# Patient Record
Sex: FEMALE | Race: WHITE
[De-identification: names, ages, dates, MRNs, and addresses within clinical notes are randomized per-mention and may not be internally consistent; named-entity substitution may affect disease eponyms.]

---

## 2017-03-30 ENCOUNTER — HOSPITAL ENCOUNTER (OUTPATIENT)
Dept: HOSPITAL 10 - GIL | Age: 67
Discharge: HOME | End: 2017-03-30
Attending: INTERNAL MEDICINE
Payer: MEDICARE

## 2017-03-30 VITALS — DIASTOLIC BLOOD PRESSURE: 71 MMHG | RESPIRATION RATE: 19 BRPM | SYSTOLIC BLOOD PRESSURE: 126 MMHG | HEART RATE: 86 BPM

## 2017-03-30 VITALS — SYSTOLIC BLOOD PRESSURE: 132 MMHG | RESPIRATION RATE: 20 BRPM | HEART RATE: 72 BPM | DIASTOLIC BLOOD PRESSURE: 62 MMHG

## 2017-03-30 VITALS
WEIGHT: 216.93 LBS | WEIGHT: 216.93 LBS | BODY MASS INDEX: 32.88 KG/M2 | HEIGHT: 68 IN | HEIGHT: 68 IN | BODY MASS INDEX: 32.88 KG/M2

## 2017-03-30 DIAGNOSIS — K44.9: ICD-10-CM

## 2017-03-30 DIAGNOSIS — K29.60: ICD-10-CM

## 2017-03-30 DIAGNOSIS — R19.4: Primary | ICD-10-CM

## 2017-03-30 DIAGNOSIS — E66.9: ICD-10-CM

## 2017-03-30 DIAGNOSIS — D12.3: ICD-10-CM

## 2017-03-30 DIAGNOSIS — I10: ICD-10-CM

## 2017-03-30 DIAGNOSIS — E78.5: ICD-10-CM

## 2017-03-30 DIAGNOSIS — K64.8: ICD-10-CM

## 2017-03-30 PROCEDURE — 88305 TISSUE EXAM BY PATHOLOGIST: CPT

## 2017-03-30 PROCEDURE — 87081 CULTURE SCREEN ONLY: CPT

## 2017-03-30 NOTE — GILP
DATE OF PROCEDURE:  

 

 

NAME OF PROCEDURES:

1.  Esophagogastroduodenoscopy and biopsy.

2.  Colonoscopy and biopsy.

 

SURGEON:  David Alexis MD

 

PREOPERATIVE DIAGNOSES:

1.  Abdominal pain.

2.  Chronic heartburn.

3.  Change in the bowel habit.

4.  Rectal bleeding.

 

POSTOPERATIVE DIAGNOSES:

1.  Hiatal hernia.

2.  Reflux esophagitis.

3.  Gastritis with erosions.

4.  Gastric mucosal biopsies were taken for Helicobacter pylori test.

5.  Colonoscopy all the way to the cecum.

6.  Small transverse colon polyp was removed using the biopsy forceps.

7.  Internal hemorrhoids.

 

INDICATION FOR THE PROCEDURE:  Ms. Lorelei Mcclelland is a 66-year-old female patient who had upper abd
ominal pain and chronic heartburn, not responding to therapy.  She also noticed a change in the yvette
l habit and rectal bleeding.  The patient was scheduled for endoscopy and colonoscopy for further ev
aluation.

 

The procedures and possible complications are well explained to the patient.  She understood and con
sented to the procedure.

 

DESCRIPTION OF PROCEDURE:  Under the influence of anesthesia, the gastroscope was carefully introduc
ed into the esophagus and under direct vision, it was advanced to the stomach and through the pyloru
s into the duodenal bulb and descending duodenum.

 

FINDINGS:

ESOPHAGUS:  The patient had hiatal hernia and reflux esophagitis.

STOMACH:  The patient had gastritis with erosions.  Gastric mucosal biopsies were taken for H. pylor
i test.

DUODENUM:  Normal.

 

The colonoscope was carefully introduced in the rectum and under direct vision, it was advanced all 
the way to the cecum.

 

FINDINGS:  The patient had a small transverse colon polyp and it was removed using the biopsy forcep
s.  She had internal hemorrhoids.

 

She tolerated the procedures very well and there was no complication from the procedures.  At the en
d of the procedures, she was awake with stable vital signs and she was discharged home to the care o
f her family.

 

IMPRESSION:  Please see postoperative diagnosis.

 

PLAN:

1.  Continue omeprazole.

2.  Anusol-HC suppository at bedtime.

3.  Await histopathology reports.  

4.  Next screening colonoscopy in 10 years.

 

 

Dictated By: DAVID PERAZA/TOBIAS

DD:    03/30/2017 15:49:12

DT:    03/30/2017 16:06:12

Conf#: [id number]

DID#:  [id number]

## 2017-03-31 NOTE — CONS
DATE OF ADMISSION: 03/30/2017

DATE OF CONSULTATION:  

 

 

 

TYPE OF CONSULTATION: Preoperative gastroenterology.

 

HISTORY OF PRESENT ILLNESS:  Ms. Lorelei Mcclelland is a 66-year-old female patient who has been referr
ed to me for further evaluation of abdominal pain and rectal bleeding.  The patient states she has e
pigastric pain and chronic heartburn, not responding to therapy with omeprazole.  There is no past h
istory of peptic ulcer disease.  The patient is taking baby aspirin a day.  There is no history of g
allstones.  She does not have any fever, chills, or jaundice.  There is no history of liver disease.
  The patient also complains of change in the bowel habit with constipation.  She also has rectal bl
eeding.  There is no past history of colon neoplasm.  The patient has hypertension.  She is not on a
ny medication at the present time.  She is not a diabetic.  She does not have any heart disease or l
neha problem.  There is no history of kidney disease.  She has hyperlipidemia.  The patient has arthr
itis and she is status post right knee replacement surgery.  She is on Norco.  She has bipolar disor
louis.

 

SOCIAL HISTORY:  She is a smoker.  She also smokes marijuana occasionally.

 

FAMILY HISTORY:  Particular for the history of pancreatic cancer in her father.

 

ALLERGIES:  SHE STATES SHE IS ALLERGIC TO SULFA.

 

MEDICATIONS:

1.  Omeprazole 40 mg.  

2.  Norco 1 tablet p.r.n.

3.  Aspirin 81 mg.

4.  Crestor 5 mg.  

5.  Celexa 40 mg b.i.d.

6.  Abilify 1 tablet a day.

 

PHYSICAL EXAMINATION:

VITAL SIGNS:  She is 5 feet 8 inches tall and she weighs 200 pounds.  BMI 31.  Blood pressure 160/10
0.

HEART:  Examination of the heart reveals normal first and second heart sounds.

LUNGS:  Clear.

ABDOMEN:  Soft without any distention.  Liver and spleen are not palpable.  There are no masses.  Th
ere is no focal tenderness.  Normal bowel sounds are heard.

RECTAL:  Examination deferred per the patient's request.  It will be done at the time of colonoscopy
.

CENTRAL NERVOUS SYSTEM:  Does not reveal any focal neurological deficit.

 

IMPRESSION:

1.  Upper abdominal pain and chronic heartburn, not responding to therapy with omeprazole.

2.  The patient is on baby aspirin a day.

3.  Change in the bowel habit with constipation.

4.  Rectal bleeding.

5.  Hypertension.

6.  Hyperlipidemia.

7.  Arthritis.

8.  Status post knee replacement surgery on the right side.

9.  Bipolar disorder.

10.  The patient's father had pancreatic cancer.

11.  The patient is a smoker.

12.  Elevated BMI.

13.  HISTORY OF ALLERGY TO SULFA.

 

PLAN:

1.  The patient was advised to stop smoking.

2.  The patient was strongly advised to lose weight.

3.  Dietary consultation was recommended.

4.  Follow up with the primary MD for the management of elevated BMI.

5.  The patient was advised to have followup with the primary MD for the followup and management of 
hypertension.

6.  Continue omeprazole.

7.  MiraLax 17 grams dissolved in a glass of water p.o. daily for constipation.

8.  Endoscopy and colonoscopy for further evaluation.

9.  Because of the multiple medical problems and obesity, the patient will need monitored anesthesia
 care for the procedures.

 

Procedures and possible complications are well explained to the patient.  She understands and consen
ts to the procedures.

 

I thank you once again.

 

With warmest personal regards,

 

 

Dictated By: LIAT PERAZA/TOBIAS

DD:    03/27/2017 16:57:31

DT:    03/27/2017 19:52:03

Conf#: [id number]

DID#:  [id number]

CC: LIAT GASTON MD;*EndCC*

## 2019-08-20 ENCOUNTER — HOSPITAL ENCOUNTER (INPATIENT)
Dept: HOSPITAL 54 - GPS | Age: 69
LOS: 3 days | Discharge: LEFT BEFORE DISCHARGE | DRG: 885 | End: 2019-08-23
Attending: NURSE PRACTITIONER | Admitting: PSYCHIATRY & NEUROLOGY
Payer: MEDICARE

## 2019-08-20 VITALS — WEIGHT: 195 LBS | BODY MASS INDEX: 29.55 KG/M2 | HEIGHT: 68 IN

## 2019-08-20 VITALS — SYSTOLIC BLOOD PRESSURE: 123 MMHG | DIASTOLIC BLOOD PRESSURE: 75 MMHG

## 2019-08-20 VITALS — SYSTOLIC BLOOD PRESSURE: 148 MMHG | DIASTOLIC BLOOD PRESSURE: 97 MMHG

## 2019-08-20 DIAGNOSIS — F31.30: Primary | ICD-10-CM

## 2019-08-20 DIAGNOSIS — F11.23: ICD-10-CM

## 2019-08-20 DIAGNOSIS — R45.851: ICD-10-CM

## 2019-08-20 DIAGNOSIS — F17.210: ICD-10-CM

## 2019-08-20 DIAGNOSIS — Z88.2: ICD-10-CM

## 2019-08-20 DIAGNOSIS — Z96.651: ICD-10-CM

## 2019-08-20 DIAGNOSIS — I10: ICD-10-CM

## 2019-08-20 DIAGNOSIS — E83.42: ICD-10-CM

## 2019-08-20 DIAGNOSIS — Z91.14: ICD-10-CM

## 2019-08-20 DIAGNOSIS — E03.9: ICD-10-CM

## 2019-08-20 DIAGNOSIS — G92: ICD-10-CM

## 2019-08-20 DIAGNOSIS — G89.4: ICD-10-CM

## 2019-08-20 DIAGNOSIS — M79.7: ICD-10-CM

## 2019-08-20 DIAGNOSIS — M25.569: ICD-10-CM

## 2019-08-20 RX ADMIN — CYCLOBENZAPRINE HYDROCHLORIDE PRN MG: 10 TABLET, FILM COATED ORAL at 22:13

## 2019-08-20 RX ADMIN — SIMVASTATIN SCH MG: 10 TABLET, FILM COATED ORAL at 22:18

## 2019-08-20 RX ADMIN — LORAZEPAM PRN MG: 1 TABLET ORAL at 22:14

## 2019-08-20 RX ADMIN — ACETAMINOPHEN PRN MG: 325 TABLET ORAL at 23:14

## 2019-08-20 RX ADMIN — DONEPEZIL HYDROCHLORIDE SCH MG: 5 TABLET ORAL at 22:14

## 2019-08-20 NOTE — NUR
Admitted a 70 y/o female from [facility]. On 5150 hold as DTS. Patient 
admitting Dx. Depression. Medical dx. chronic pain at right knee, fibromyalgia, redness 
breast fold fungal. Upon face to face evaluation, patient appeared alert and oriented x 3, 
calm, cooperative, hyperverbal, needy, paranoid, suspicious, denies SI/HI/AH/VH. Body check 
done. Picture done. Explained the paper works and patient signed the consents. Informed of 
visiting hours and unit policies. Belongings and contraband checked. Q15 min checks 
initiated. Care plan started. Vital signs checked and recorded. Patient's rights discussed, 
guide to prescription meds handbook provided. Patient  advised of the hold. Notified Craig Antoine to reconcile meds and order of wound consult,  notified Sapphire (daughter) of the 
patients admission, notified Dr. Sims of the admission. Will monitor patient for mood, 
safety and behavior. Will endorse to the day shift.

## 2019-08-21 VITALS — SYSTOLIC BLOOD PRESSURE: 148 MMHG | DIASTOLIC BLOOD PRESSURE: 97 MMHG

## 2019-08-21 VITALS — SYSTOLIC BLOOD PRESSURE: 148 MMHG | DIASTOLIC BLOOD PRESSURE: 96 MMHG

## 2019-08-21 VITALS — SYSTOLIC BLOOD PRESSURE: 163 MMHG | DIASTOLIC BLOOD PRESSURE: 85 MMHG

## 2019-08-21 VITALS — SYSTOLIC BLOOD PRESSURE: 161 MMHG | DIASTOLIC BLOOD PRESSURE: 69 MMHG

## 2019-08-21 LAB
BASOPHILS # BLD AUTO: 0.1 /CMM (ref 0–0.2)
BASOPHILS NFR BLD AUTO: 0.6 % (ref 0–2)
BUN SERPL-MCNC: 16 MG/DL (ref 7–18)
CALCIUM SERPL-MCNC: 9.3 MG/DL (ref 8.5–10.1)
CHLORIDE SERPL-SCNC: 102 MMOL/L (ref 98–107)
CHOLEST SERPL-MCNC: 241 MG/DL (ref ?–200)
CO2 SERPL-SCNC: 23 MMOL/L (ref 21–32)
CREAT SERPL-MCNC: 0.7 MG/DL (ref 0.6–1.3)
EOSINOPHIL NFR BLD AUTO: 0.3 % (ref 0–6)
GLUCOSE SERPL-MCNC: 118 MG/DL (ref 74–106)
HCT VFR BLD AUTO: 43 % (ref 33–45)
HDLC SERPL-MCNC: 55 MG/DL (ref 40–60)
HGB BLD-MCNC: 14.8 G/DL (ref 11.5–14.8)
LDLC SERPL DIRECT ASSAY-MCNC: 158 MG/DL (ref 0–99)
LYMPHOCYTES NFR BLD AUTO: 1.8 /CMM (ref 0.8–4.8)
LYMPHOCYTES NFR BLD AUTO: 17.7 % (ref 20–44)
MAGNESIUM SERPL-MCNC: 1.7 MG/DL (ref 1.8–2.4)
MCHC RBC AUTO-ENTMCNC: 34 G/DL (ref 31–36)
MCV RBC AUTO: 89 FL (ref 82–100)
MONOCYTES NFR BLD AUTO: 0.8 /CMM (ref 0.1–1.3)
MONOCYTES NFR BLD AUTO: 8.4 % (ref 2–12)
NEUTROPHILS # BLD AUTO: 7.4 /CMM (ref 1.8–8.9)
NEUTROPHILS NFR BLD AUTO: 73 % (ref 43–81)
PHOSPHATE SERPL-MCNC: 3.7 MG/DL (ref 2.5–4.9)
PLATELET # BLD AUTO: 252 /CMM (ref 150–450)
POTASSIUM SERPL-SCNC: 3.7 MMOL/L (ref 3.5–5.1)
RBC # BLD AUTO: 4.82 MIL/UL (ref 4–5.2)
SODIUM SERPL-SCNC: 138 MMOL/L (ref 136–145)
TRIGL SERPL-MCNC: 147 MG/DL (ref 30–150)
TSH SERPL DL<=0.005 MIU/L-ACNC: 4.09 UIU/ML (ref 0.36–3.74)
WBC NRBC COR # BLD AUTO: 10.1 K/UL (ref 4.3–11)

## 2019-08-21 RX ADMIN — PANTOPRAZOLE SODIUM SCH MG: 40 TABLET, DELAYED RELEASE ORAL at 08:12

## 2019-08-21 RX ADMIN — DIVALPROEX SODIUM SCH MG: 250 TABLET, DELAYED RELEASE ORAL at 14:24

## 2019-08-21 RX ADMIN — CYCLOBENZAPRINE HYDROCHLORIDE PRN MG: 10 TABLET, FILM COATED ORAL at 10:28

## 2019-08-21 RX ADMIN — LORAZEPAM PRN MG: 1 TABLET ORAL at 04:16

## 2019-08-21 RX ADMIN — Medication PRN EACH: at 06:51

## 2019-08-21 RX ADMIN — Medication PRN EACH: at 00:15

## 2019-08-21 RX ADMIN — LORAZEPAM PRN MG: 1 TABLET ORAL at 08:12

## 2019-08-21 RX ADMIN — LEVOTHYROXINE SODIUM SCH MCG: 25 TABLET ORAL at 08:12

## 2019-08-21 RX ADMIN — ACETAMINOPHEN PRN MG: 325 TABLET ORAL at 05:31

## 2019-08-21 RX ADMIN — LORAZEPAM PRN MG: 1 TABLET ORAL at 21:31

## 2019-08-21 RX ADMIN — ZOLPIDEM TARTRATE PRN MG: 5 TABLET, FILM COATED ORAL at 21:31

## 2019-08-21 RX ADMIN — DIVALPROEX SODIUM SCH MG: 250 TABLET, DELAYED RELEASE ORAL at 21:31

## 2019-08-21 RX ADMIN — Medication PRN EACH: at 11:23

## 2019-08-21 RX ADMIN — Medication PRN EACH: at 20:08

## 2019-08-21 RX ADMIN — Medication PRN EACH: at 16:03

## 2019-08-21 RX ADMIN — CLOTRIMAZOLE AND BETAMETHASONE DIPROPIONATE SCH GM: 10; .5 CREAM TOPICAL at 17:20

## 2019-08-21 RX ADMIN — DONEPEZIL HYDROCHLORIDE SCH MG: 5 TABLET ORAL at 21:31

## 2019-08-21 RX ADMIN — NICOTINE SCH MG: 21 PATCH TRANSDERMAL at 08:20

## 2019-08-21 RX ADMIN — SIMVASTATIN SCH MG: 10 TABLET, FILM COATED ORAL at 21:32

## 2019-08-21 RX ADMIN — CITALOPRAM SCH MG: 20 TABLET ORAL at 14:24

## 2019-08-21 RX ADMIN — MEMANTINE HYDROCHLORIDE SCH MG: 5 TABLET ORAL at 08:16

## 2019-08-21 RX ADMIN — ZOLPIDEM TARTRATE PRN MG: 5 TABLET, FILM COATED ORAL at 01:17

## 2019-08-21 RX ADMIN — LEVOTHYROXINE SODIUM SCH MCG: 25 TABLET ORAL at 06:52

## 2019-08-21 NOTE — NUR
RN NOTE: CONTACTED DR. GRIJALVA IN REGARDS TO PATIENT STATING SHE'S HAVING WITHDRAWAL 
SYMPTOMS. PATIENT PRESENTS WITH RESTLESS, ANXIOUS BEHAVIOR, SWEATING AND CRYING IN HALLS. 
RETRIEVED ORDERS TO CHANGE NORCO TO Q4 HOURS PRN AND TO GET A PAIN CONSULT.

## 2019-08-21 NOTE — NUR
EMILY called the pts friend, Jaya ([phone]), and informed him about the pts initial 
treatment plan and discharge plan and then inquired about whether or not he has the pts 
daughters phone number. He stated that he did not have it and stated that he would visit the 
pt.

## 2019-08-21 NOTE — NUR
Initial Discharge Plan: Pt currently resides at her home located at [address] [place] [address]; ([phone]). Per pt, she would like to return home and then go 
to a treatment center. EMILY will work with the pt and the MD regarding appropriate discharge 
planning. SW will form a safe and proper discharge.

## 2019-08-21 NOTE — NUR
RN NOTE: PATIENT IS COMPLAINING OF EXTREME PAIN. ALL OF HER PAIN MEDS WERE EXHAUSTED. THEN, 
PATIENT STATED SHES NOT IN PAIN AND SHE NEEDS TO GO SOMEWHERE FOR DETOX THERAPY. INFORMED 
MD RUDI OF PATIENT'S COMPLAINTS. PATIENT IS VERY ANXIOUS AND KEEPS STATING SHE WANTS TO 
GO TO A DETOX CENTER. INFORMED DR. LOZANO THAT PATIENT WANTS TO SPEAK TO HIM AND HE STATED 
HE WILL BE SEEING HER TODAY. PRN HYDRALAZINE GIVEN TO PATIENT FOR /69.

## 2019-08-21 NOTE — NUR
RN NOTE: PAIN MANAGEMENT CONSULT ORDER INPUT. CONTACTED THE OFFICES OF [facility] 
AND LEFT A VOICEMAIL FOR CONSULTATION. INFORMED PATIENT ABOUT WAYS TO MANAGE PAIN BUT 
PATIENT ONLY WANTS PAIN MEDICATION. THROUGH OBSERVATION, PATIENT IS VERY ANXIOUS AND 
AGITATED WHEN SHE REQUESTS PAIN MEDICATION. A MOMENT AFTER MEDICATION WAS GIVEN, PATIENT WAS 
ROAMING DOWN HALLWAYS WITH A SMILE ON HER FACE, NO FACIAL GRIMACE OR AGITATION AND NO 
NON-VERBAL SIGNS OF DISTRESS NOTED. I INFORMED PATIENT THAT I HAD CHANGED THE FREQUENCY PER 
DR ORDER FOR NORCO FROM Q6HRS TO Q4HRS AND SHE WAS STILL UNHAPPY. I ALSO INFORMED HER THAT I 
HAD MADE A PAIN MANAGEMENT ORDER FOR CONSULTATION.

## 2019-08-21 NOTE — NUR
GPS RN NOTE:



PATIENT C/O 9/10 CRAMPING PAIN ON BOTH LEGS, PATIENT WAS SHAKING, UNEASY, REQUESTING FOR 
NORCO , BECAUSE PER PATIENT SHE IS HAVING A WITHDRAWAL. HOT COMPRESS GIVEN, FLEXERIL GIVEN 
AS ORDERED, ATIVAN 1MG PO GIVEN FOR ANXIETY, TYLENOL GIVEN AS REQUESTED. PER PATIENT ITS NOT 
EFFECTIVE. NOTIFIED NNSHAHLA BROOKS WITH ORDER OF NORCO GIVEN NOTED AND CARRIED OUT. WILL 
CONTINUE TO MONITOR Q15 MINS FOR SAFETY

-------------------------------------------------------------------------------

Addendum: 08/21/19 at 0535 by JACQUELYN RAMIRES II, RN

-------------------------------------------------------------------------------

OFFERED TRAMADOL AS ORDERED AND PATIENT REFUSED, DOUGLAS BURKS

## 2019-08-21 NOTE — NUR
WOUND CARE CONSULT    PATIENT SEEN AND SKIN ASSESSMENT DONE.  PATIENT PRESENTS WITH REDNESS 
WITH ODOR TO THE BILATERAL BREASTFOLDS, BILATERAL GROIN FOLDS, OUTER LABIAL/VAGINAL REGIONS 
POA.  RECOMMEND LOTRISONE CREAM BID X 10 DAYS.  PATIENT IS AMBULATORY AND CONTINENT.  WILL 
SEE PRN.  ALL DISCUSSED WITH NURSING AT THE BEDSIDE.

## 2019-08-21 NOTE — NUR
GPS RN NOTE, PATIENT HAS A COMPLAINT OF NOT BEING ABLE TO SLEEP AND IS REQUESTING AMBIEN AT 
THIS TIME.  PATIENT VITAL SIGNS ARE STABLE.  GAVE AMBIEN 5 MG PO HS PRN AS ORDERED.  WILL 
REASSESS FOR INSOMNIA AND I WILL CONTINUE TO MONITOR THIS PATIENT.

## 2019-08-22 VITALS — SYSTOLIC BLOOD PRESSURE: 153 MMHG | DIASTOLIC BLOOD PRESSURE: 76 MMHG

## 2019-08-22 VITALS — SYSTOLIC BLOOD PRESSURE: 133 MMHG | DIASTOLIC BLOOD PRESSURE: 76 MMHG

## 2019-08-22 VITALS — SYSTOLIC BLOOD PRESSURE: 154 MMHG | DIASTOLIC BLOOD PRESSURE: 77 MMHG

## 2019-08-22 LAB
APPEARANCE UR: CLEAR
BILIRUB UR QL STRIP: NEGATIVE
COLOR UR: YELLOW
GLUCOSE UR STRIP-MCNC: NEGATIVE MG/DL
HGB UR QL STRIP: NEGATIVE ERY/UL
KETONES UR STRIP-MCNC: NEGATIVE MG/DL
LEUKOCYTE ESTERASE UR QL STRIP: NEGATIVE
NITRITE UR QL STRIP: NEGATIVE
PH UR STRIP: 6.5 [PH] (ref 5–8)
PROT UR QL STRIP: NEGATIVE MG/DL
UROBILINOGEN UR STRIP-MCNC: 0.2 EU/DL

## 2019-08-22 RX ADMIN — NICOTINE SCH MG: 21 PATCH TRANSDERMAL at 08:17

## 2019-08-22 RX ADMIN — Medication PRN EACH: at 04:10

## 2019-08-22 RX ADMIN — Medication PRN EACH: at 12:20

## 2019-08-22 RX ADMIN — Medication PRN EACH: at 08:13

## 2019-08-22 RX ADMIN — DONEPEZIL HYDROCHLORIDE SCH MG: 5 TABLET ORAL at 21:09

## 2019-08-22 RX ADMIN — SIMVASTATIN SCH MG: 10 TABLET, FILM COATED ORAL at 21:09

## 2019-08-22 RX ADMIN — LEVOTHYROXINE SODIUM SCH MCG: 25 TABLET ORAL at 08:13

## 2019-08-22 RX ADMIN — Medication PRN EACH: at 22:51

## 2019-08-22 RX ADMIN — MEMANTINE HYDROCHLORIDE SCH MG: 5 TABLET ORAL at 08:13

## 2019-08-22 RX ADMIN — LORAZEPAM PRN MG: 1 TABLET ORAL at 21:47

## 2019-08-22 RX ADMIN — DIVALPROEX SODIUM SCH MG: 250 TABLET, DELAYED RELEASE ORAL at 08:13

## 2019-08-22 RX ADMIN — Medication PRN EACH: at 00:06

## 2019-08-22 RX ADMIN — GABAPENTIN SCH MG: 300 CAPSULE ORAL at 16:53

## 2019-08-22 RX ADMIN — DIVALPROEX SODIUM SCH MG: 250 TABLET, DELAYED RELEASE ORAL at 21:09

## 2019-08-22 RX ADMIN — PANTOPRAZOLE SODIUM SCH MG: 40 TABLET, DELAYED RELEASE ORAL at 08:13

## 2019-08-22 RX ADMIN — CITALOPRAM SCH MG: 20 TABLET ORAL at 08:13

## 2019-08-22 RX ADMIN — CLOTRIMAZOLE AND BETAMETHASONE DIPROPIONATE SCH GM: 10; .5 CREAM TOPICAL at 09:02

## 2019-08-22 RX ADMIN — CLOTRIMAZOLE AND BETAMETHASONE DIPROPIONATE SCH GM: 10; .5 CREAM TOPICAL at 16:54

## 2019-08-22 NOTE — NUR
RN NOTE: PATIENT DIDN'T WANT HER NORCO ANYMORE AND STATED SHE WANTED TO WAIT A LITTLE BIT 
LONGER. NORCO RETURNED.

## 2019-08-22 NOTE — NUR
EMILY faxed a referral to [facility] with attention to Migue to the fax number: 
[phone].

## 2019-08-22 NOTE — NUR
Group Note: Pt was encouraged to participate in group therapy but the pt refused because she 
stated that she was in too much pain and needed some medicine. SW then discussed the pts 
need for pain medicine and asked her to understand when it is necessary and when it is not. 
Pt stated that she does take it when her pain is not that high and she stated that she will 
try.

## 2019-08-22 NOTE — NUR
EMILY met with the pt and the pts psychologist, Dr. Pham, and discussed a program that 
would be beneficial for the pt to attend once she has completed her detox. EMILY stated that 
she will refer the pt to [facility].

## 2019-08-22 NOTE — NUR
RN NOTE: PATIENT COMPLAINING ON CONSTIPATION; PRN MOM GIVEN. PATIENT ALSO COMPLAINING OF 
8/10 PAIN, PRN NORCO GIVEN.

## 2019-08-22 NOTE — NUR
Migue ([phone]) from [facility] called the SW and stated that the pt was 
not accepted due to her insurance and recommended [facility]. 2

## 2019-08-22 NOTE — NUR
RN NOTE: PATIENT SEEMS TO BE HANDLING HER ANXIETY AND BEHAVIOR A LOT BETTER TODAY. PATIENT 
STILL COMPLAINS OF PAIN BUT IS ABLE TO MANAGE IT BETTER.

## 2019-08-22 NOTE — NUR
SW spoke to the pt regarding sending a referral to [facility] and she stated 
that she did not like that facility and has been there before. She stated that it felt like 
a longterm full of prisoner. Pt stated that there is a program through [facility] that does 
detox and rehab in [place] and that she is going to connect herself.

## 2019-08-23 VITALS — SYSTOLIC BLOOD PRESSURE: 125 MMHG | DIASTOLIC BLOOD PRESSURE: 79 MMHG

## 2019-08-23 RX ADMIN — MEMANTINE HYDROCHLORIDE SCH MG: 5 TABLET ORAL at 08:09

## 2019-08-23 RX ADMIN — Medication PRN EACH: at 14:52

## 2019-08-23 RX ADMIN — ZOLPIDEM TARTRATE PRN MG: 5 TABLET, FILM COATED ORAL at 00:10

## 2019-08-23 RX ADMIN — DIVALPROEX SODIUM SCH MG: 250 TABLET, DELAYED RELEASE ORAL at 08:09

## 2019-08-23 RX ADMIN — CITALOPRAM SCH MG: 20 TABLET ORAL at 08:09

## 2019-08-23 RX ADMIN — CLOTRIMAZOLE AND BETAMETHASONE DIPROPIONATE SCH GM: 10; .5 CREAM TOPICAL at 11:04

## 2019-08-23 RX ADMIN — GABAPENTIN SCH MG: 300 CAPSULE ORAL at 08:09

## 2019-08-23 RX ADMIN — Medication PRN EACH: at 04:46

## 2019-08-23 RX ADMIN — GABAPENTIN SCH MG: 300 CAPSULE ORAL at 13:06

## 2019-08-23 RX ADMIN — PANTOPRAZOLE SODIUM SCH MG: 40 TABLET, DELAYED RELEASE ORAL at 08:09

## 2019-08-23 RX ADMIN — Medication PRN EACH: at 09:28

## 2019-08-23 RX ADMIN — NICOTINE SCH MG: 21 PATCH TRANSDERMAL at 09:00

## 2019-08-23 NOTE — NUR
RN DISCHARGE NOTE: 

PATIENT IS A 69 YEAR OLD FEMALE DISCHARGED BACK HOME AMA TO [address] [facility] [address] [phone]. PATIENT IS IN STABLE CONDITION. VSS. NO ACUTE DISTRESS NOTED. 
COMPLIANT WITH MEDICATION MANAGEMENT. COOPERATIVE WITH PLAN OF CARE. MEDICAL TREATMENT PLANS 
DEFERRED FOR CONTINUAL MONITORING. DENIES SI/HI VAH AT THE TIME OF DISCHARGE. PATIENT DID 
NOT WANT PHOTOS TO BE TAKEN OF WOUNDS BUT WAS ABLE TO SEE HER WOUNDS. PATIENT STILL HAS 
REDNESS ON BREAST FOLDS AND ABDOMINAL FOLDS BEING TREATED WITH SOAP, WATER, DRIED AND 
APPLICATION OF CLOMITRAZOLE. EDUCATED PATIENT ABOUT AFTERCARE WITH COPY PROVIDED. RETURNED 
PERSONAL BELONGINGS TO PATIENT. 



MD RUDI AND DR LOZANO AWARE OF AMA. AMA PAPERWORK SIGNED AND IN CHART. DISCHARGE 
PAPERWORK SIGNED. 



FOR FOLLOW UP WITH PSYCHIATRIST DR HIMANSHU ADAMS LOCATED AT [address] [facility] 
[id number] [phone] AND INTERNIST LOCATED  [address] [facility] [address] [phone] AND INTERNIST AT WITHIN 1 WEEK. PATIENT LEFT THE [facility] GPS WITH HER DAUGHTER 
HECTOR AT 1535 VIA PRIVATE CAR.                      .

## 2019-08-23 NOTE — NUR
RN NOTE: PATIENT REPORTED SHE HAD FALLEN AT 0830. SHE STATED THAT AFTER HER SHOWER, SHE 
OPENED THE DOOR AND STEPPED IN THE HALLWAY WHERE SHE SLIPPED ON TO THE FLOOR ON HER BUM. 
PATIENT DENIES HITTING HER HEAD. PRIOR TO INCIDENT. PATIENT INFORMED CNA THAT SHE WANTED TO 
SHOWER ON HER OWN AND DID NOT NEED ANY ASSISTANCE. REPORTS PAIN 6/10 AT RIGHT BUTTOCK. NO 
INJURIES NOTED UPON BODY CHECK. NEURO CHECK COMPLETE. VSS. ALERT AND ORIENTED X3. PERRLA. 
CHARGE NURSE NOTIFIED. UNIT MANAGER NOTIFIED. SUPERVISOR NOTIFIED. WILL CONTACT RESPONSIBLE 
PARTY FOR REVIEW OF FOOTAGE AND FURTHER INVESTIGATION. CONTACTED MD RUDI AND RECEIVED 
ORDERS FOR STAT XR PELVIC. PATIENT AWARE OF PLAN AND IS SELF RESPONSIBLE. 

-------------------------------------------------------------------------------

Addendum: 08/23/19 at 1152 by LOY SLOAN RN

-------------------------------------------------------------------------------

RADIOLOGY ARRIVED TO UNIT AND COMPLETED X-RAY. AWAITING RESULTS.

## 2019-08-23 NOTE — NUR
RN NOTE: PATIENT COMPLAINING OF PAIN 7/10; CRISTAL GERMANCO GIVEN. 

-------------------------------------------------------------------------------

Addendum: 08/23/19 at 1216 by LOY SLOAN RN

-------------------------------------------------------------------------------

A FEW MOMENTS LATER, I HAD GONE IN THE [employer] TO RETRIEVE A MAALOX MAX BECAUSE PATIENT WAS 
COMPLAINING OF HEARTBURN. 



WHEN I SIGNED IN TO THE [employer], I NOTICED A MESSAGE STATING THAT I HAD A NORCO TO WASTE 
AND TO DO A COUNT WITH THE CNFIDEL. WHEN THE PHARMACY TECHNICIAN CAME TO THE UNIT, WE 
LOOKED OVER THE [employer] ALERT TOGETHER WITH THE CN. THE MESSAGE STATED THAT THE QUANTITY 
REMOVED WAS 3 AND 2 HAD TO BE WASTED WHEN, IN FACT, I HAD ONLY PULLED OUT 1 MEDICATION. 

WE CHECKED FOR DISCREPANCIES AND THERE WERE NONE. 

THE WASTE WAS DOCUMENTED AS AN ERROR WITH THE CN AS A CO-WITNESS/CO-SIGNER. 

AFTER THE WASTE WAS DOCUMENTED WITH THE NOTE IN [employer], WE EXITED AND AND I RE-SIGNED IN 
TO VERIFY THERE ARE NO MORE WASTES IN WHICH THERE WEREN'T. 

DISCREPANCIES WERE RE-CHECKED (08/23/2019 @ 10:30:27) AND IT STATED THAT THERE WERE "NO MORE 
DISCREPANCIES TO RESOLVE THAT YOU HAVE ACCESS TO".

## 2019-08-23 NOTE — NUR
EMILY faxed a referral to [facility] with attention to Phuong to the fax number: 
[phone].

## 2021-02-19 ENCOUNTER — HOSPITAL ENCOUNTER (INPATIENT)
Dept: HOSPITAL 12 - ER | Age: 71
LOS: 8 days | Discharge: HOME | DRG: 885 | End: 2021-02-27
Payer: MEDICARE

## 2021-02-19 VITALS — BODY MASS INDEX: 33.43 KG/M2 | HEIGHT: 67 IN | WEIGHT: 213 LBS

## 2021-02-19 VITALS — SYSTOLIC BLOOD PRESSURE: 150 MMHG | DIASTOLIC BLOOD PRESSURE: 69 MMHG

## 2021-02-19 VITALS — DIASTOLIC BLOOD PRESSURE: 63 MMHG | SYSTOLIC BLOOD PRESSURE: 120 MMHG

## 2021-02-19 DIAGNOSIS — Z79.890: ICD-10-CM

## 2021-02-19 DIAGNOSIS — I10: ICD-10-CM

## 2021-02-19 DIAGNOSIS — G89.4: ICD-10-CM

## 2021-02-19 DIAGNOSIS — E03.9: ICD-10-CM

## 2021-02-19 DIAGNOSIS — F41.9: ICD-10-CM

## 2021-02-19 DIAGNOSIS — F23: ICD-10-CM

## 2021-02-19 DIAGNOSIS — E87.6: ICD-10-CM

## 2021-02-19 DIAGNOSIS — F17.210: ICD-10-CM

## 2021-02-19 DIAGNOSIS — F31.60: Primary | ICD-10-CM

## 2021-02-19 DIAGNOSIS — M79.7: ICD-10-CM

## 2021-02-19 LAB
ALP SERPL-CCNC: 62 U/L (ref 50–136)
ALT SERPL W/O P-5'-P-CCNC: 23 U/L (ref 14–59)
AMPHETAMINES UR QL SCN>1000 NG/ML: NEGATIVE
APAP SERPL-MCNC: < 2 UG/ML (ref 10–30)
APPEARANCE UR: CLEAR
AST SERPL-CCNC: 11 U/L (ref 15–37)
BASOPHILS # BLD AUTO: 0.1 K/UL (ref 0–8)
BASOPHILS NFR BLD AUTO: 0.9 % (ref 0–2)
BILIRUB DIRECT SERPL-MCNC: 0.2 MG/DL (ref 0–0.2)
BILIRUB SERPL-MCNC: 0.6 MG/DL (ref 0.2–1)
BILIRUB UR QL STRIP: NEGATIVE
BUN SERPL-MCNC: 9 MG/DL (ref 7–18)
CHLORIDE SERPL-SCNC: 104 MMOL/L (ref 98–107)
CO2 SERPL-SCNC: 25 MMOL/L (ref 21–32)
COCAINE UR QL SCN: NEGATIVE
COLOR UR: (no result)
CREAT SERPL-MCNC: 0.7 MG/DL (ref 0.6–1.3)
EOSINOPHIL # BLD AUTO: 0 K/UL (ref 0–0.7)
EOSINOPHIL NFR BLD AUTO: 0.4 % (ref 0–7)
ETHANOL SERPL-MCNC: < 3 MG/DL (ref 0–0)
GLUCOSE SERPL-MCNC: 94 MG/DL (ref 74–106)
GLUCOSE UR STRIP-MCNC: NEGATIVE MG/DL
HCT VFR BLD AUTO: 44.7 % (ref 31.2–41.9)
HGB BLD-MCNC: 15.1 G/DL (ref 10.9–14.3)
HGB UR QL STRIP: NEGATIVE
KETONES UR STRIP-MCNC: NEGATIVE MG/DL
LEUKOCYTE ESTERASE UR QL STRIP: NEGATIVE
LYMPHOCYTES # BLD AUTO: 2.2 K/UL (ref 20–40)
LYMPHOCYTES NFR BLD AUTO: 20.8 % (ref 20.5–51.5)
MAGNESIUM SERPL-MCNC: 2.1 MG/DL (ref 1.8–2.4)
MCH RBC QN AUTO: 30.5 UUG (ref 24.7–32.8)
MCHC RBC AUTO-ENTMCNC: 34 G/DL (ref 32.3–35.6)
MCV RBC AUTO: 90.3 FL (ref 75.5–95.3)
MONOCYTES # BLD AUTO: 1.2 K/UL (ref 2–10)
MONOCYTES NFR BLD AUTO: 11.4 % (ref 0–11)
NEUTROPHILS # BLD AUTO: 7.1 K/UL (ref 1.8–8.9)
NEUTROPHILS NFR BLD AUTO: 66.5 % (ref 38.5–71.5)
NITRITE UR QL STRIP: NEGATIVE
OPIATES UR QL SCN: NEGATIVE
PCP UR QL SCN>25 NG/ML: NEGATIVE
PH UR STRIP: 7 [PH] (ref 5–8)
PHOSPHATE SERPL-MCNC: 2.6 MG/DL (ref 2.5–4.9)
PLATELET # BLD AUTO: 279 K/UL (ref 179–408)
POTASSIUM SERPL-SCNC: 3.2 MMOL/L (ref 3.5–5.1)
RBC # BLD AUTO: 4.95 MIL/UL (ref 3.63–4.92)
SP GR UR STRIP: 1.01 (ref 1–1.03)
THC UR QL SCN>50 NG/ML: POSITIVE
UROBILINOGEN UR STRIP-MCNC: 0.2 E.U./DL
WBC # BLD AUTO: 10.6 K/UL (ref 3.8–11.8)
WS STN SPEC: 7.4 G/DL (ref 6.4–8.2)

## 2021-02-19 PROCEDURE — A4663 DIALYSIS BLOOD PRESSURE CUFF: HCPCS

## 2021-02-19 PROCEDURE — G0480 DRUG TEST DEF 1-7 CLASSES: HCPCS

## 2021-02-19 RX ADMIN — CLONAZEPAM PRN MG: 0.5 TABLET ORAL at 20:03

## 2021-02-19 RX ADMIN — Medication PRN MG: at 20:04

## 2021-02-19 RX ADMIN — SIMVASTATIN SCH MG: 20 TABLET, FILM COATED ORAL at 20:03

## 2021-02-19 NOTE — NUR
Received patient AAOx3 walking around the unit. No s/s of acute distress noted at this time. 
Patient compliant with medications. Administered pain medication per patient request and 
medication order. Patient received patient rights information. Pleasant upon interaction. 
Safety measures in place and will continue with plan of care.

## 2021-02-19 NOTE — NUR
Gps/Lvn-  Received report from [facility] . Received via wheel chair from ER, alert, 
oriented x4 , extremely anxious , angry affect , upset that staff has to take away her cell 
phone , and belongings. Informed patient, rules of the unit, and routines. Complained of 
pain all over 7/10 r/t fibromyalgia . Per report from ER ([facility])  patient was given  
tramadol 50 mg po 1 tab.  @ 1810 , K+ 40 meq. 1 tab was also given in ER for K+ 3.2. Ativan 
1 mg  1 tab po was given in ER  for extreme anxiety 1 hour ago..Refused to be weighed , 
claimed she does no want to be depressed from knowing her actual weight, per pt. her weight 
was 220 lbs. Oriented to unit settings.

## 2021-02-20 VITALS — DIASTOLIC BLOOD PRESSURE: 75 MMHG | SYSTOLIC BLOOD PRESSURE: 148 MMHG

## 2021-02-20 VITALS — SYSTOLIC BLOOD PRESSURE: 132 MMHG | DIASTOLIC BLOOD PRESSURE: 61 MMHG

## 2021-02-20 VITALS — DIASTOLIC BLOOD PRESSURE: 75 MMHG | SYSTOLIC BLOOD PRESSURE: 145 MMHG

## 2021-02-20 RX ADMIN — SIMVASTATIN SCH MG: 20 TABLET, FILM COATED ORAL at 17:37

## 2021-02-20 RX ADMIN — Medication PRN MG: at 15:57

## 2021-02-20 RX ADMIN — VALSARTAN SCH MG: 40 TABLET ORAL at 10:49

## 2021-02-20 RX ADMIN — CLONAZEPAM PRN MG: 0.5 TABLET ORAL at 14:30

## 2021-02-20 RX ADMIN — DIVALPROEX SODIUM SCH MG: 250 TABLET, DELAYED RELEASE ORAL at 17:39

## 2021-02-20 RX ADMIN — TEMAZEPAM PRN MG: 7.5 CAPSULE ORAL at 21:41

## 2021-02-20 RX ADMIN — CLONAZEPAM PRN MG: 0.5 TABLET ORAL at 09:01

## 2021-02-20 RX ADMIN — LEVOTHYROXINE SODIUM SCH MCG: 75 TABLET ORAL at 06:12

## 2021-02-20 RX ADMIN — CLONAZEPAM PRN MG: 0.5 TABLET ORAL at 19:59

## 2021-02-20 RX ADMIN — Medication PRN MG: at 22:35

## 2021-02-20 RX ADMIN — PANTOPRAZOLE SODIUM SCH MG: 40 TABLET, DELAYED RELEASE ORAL at 06:12

## 2021-02-20 RX ADMIN — CITALOPRAM HYDROBROMIDE SCH MG: 20 TABLET, FILM COATED ORAL at 17:39

## 2021-02-20 RX ADMIN — ARIPIPRAZOLE SCH MG: 10 TABLET ORAL at 20:03

## 2021-02-21 VITALS — DIASTOLIC BLOOD PRESSURE: 60 MMHG | SYSTOLIC BLOOD PRESSURE: 141 MMHG

## 2021-02-21 VITALS — DIASTOLIC BLOOD PRESSURE: 58 MMHG | SYSTOLIC BLOOD PRESSURE: 160 MMHG

## 2021-02-21 VITALS — DIASTOLIC BLOOD PRESSURE: 70 MMHG | SYSTOLIC BLOOD PRESSURE: 167 MMHG

## 2021-02-21 LAB
ALP SERPL-CCNC: 61 U/L (ref 50–136)
ALT SERPL W/O P-5'-P-CCNC: 24 U/L (ref 14–59)
AST SERPL-CCNC: 12 U/L (ref 15–37)
BILIRUB SERPL-MCNC: 0.6 MG/DL (ref 0.2–1)
BUN SERPL-MCNC: 12 MG/DL (ref 7–18)
CHLORIDE SERPL-SCNC: 105 MMOL/L (ref 98–107)
CHOLEST SERPL-MCNC: 148 MG/DL (ref ?–200)
CO2 SERPL-SCNC: 27 MMOL/L (ref 21–32)
CREAT SERPL-MCNC: 0.7 MG/DL (ref 0.6–1.3)
GLUCOSE SERPL-MCNC: 135 MG/DL (ref 74–106)
HDLC SERPL-MCNC: 38 MG/DL (ref 40–60)
POTASSIUM SERPL-SCNC: 3.7 MMOL/L (ref 3.5–5.1)
TRIGL SERPL-MCNC: 107 MG/DL (ref 30–150)
TSH SERPL DL<=0.005 MIU/L-ACNC: 3.17 MIU/ML (ref 0.36–3.74)
WS STN SPEC: 6.9 G/DL (ref 6.4–8.2)

## 2021-02-21 RX ADMIN — MAGNESIUM HYDROXIDE PRN ML: 400 SUSPENSION ORAL at 12:29

## 2021-02-21 RX ADMIN — CLONAZEPAM PRN MG: 0.5 TABLET ORAL at 16:32

## 2021-02-21 RX ADMIN — CITALOPRAM HYDROBROMIDE SCH MG: 20 TABLET, FILM COATED ORAL at 08:18

## 2021-02-21 RX ADMIN — SIMVASTATIN SCH MG: 20 TABLET, FILM COATED ORAL at 17:22

## 2021-02-21 RX ADMIN — Medication PRN MG: at 18:12

## 2021-02-21 RX ADMIN — TEMAZEPAM PRN MG: 7.5 CAPSULE ORAL at 21:48

## 2021-02-21 RX ADMIN — VALSARTAN SCH MG: 40 TABLET ORAL at 08:18

## 2021-02-21 RX ADMIN — DIVALPROEX SODIUM SCH MG: 250 TABLET, DELAYED RELEASE ORAL at 08:18

## 2021-02-21 RX ADMIN — DIVALPROEX SODIUM SCH MG: 250 TABLET, DELAYED RELEASE ORAL at 12:02

## 2021-02-21 RX ADMIN — PANTOPRAZOLE SODIUM SCH MG: 40 TABLET, DELAYED RELEASE ORAL at 06:36

## 2021-02-21 RX ADMIN — Medication PRN MG: at 06:57

## 2021-02-21 RX ADMIN — CLONAZEPAM PRN MG: 0.5 TABLET ORAL at 21:25

## 2021-02-21 RX ADMIN — ARIPIPRAZOLE SCH MG: 10 TABLET ORAL at 20:11

## 2021-02-21 RX ADMIN — CLONAZEPAM PRN MG: 0.5 TABLET ORAL at 12:02

## 2021-02-21 RX ADMIN — DIVALPROEX SODIUM SCH MG: 250 TABLET, DELAYED RELEASE ORAL at 16:32

## 2021-02-21 RX ADMIN — LEVOTHYROXINE SODIUM SCH MCG: 75 TABLET ORAL at 06:36

## 2021-02-21 RX ADMIN — CLONAZEPAM PRN MG: 0.5 TABLET ORAL at 06:25

## 2021-02-21 NOTE — NUR
Received Pt on the phone in her room. Pt stayed on the phone until it shut off at 2200. Pt 
was emotional and upset on approach, stated she "needed the phone back to speak to my 
family." Pt was fixated with the phone and seeking medication "to help with sleep, I haven't 
slept in 10 days, I'm manic bipolar for years, no medication works for me." Pt was 
tangential and hyperverbal with pressured speech. Continued compliance encouraged, 
therapeutic support provided. Pt was notably anxious and restless, Klonopin 0.5mg 
administered at 1959 and 0630 with moderate effect. Denies current thoughts of ST and 
verbally contracts for safety. Restoril given at 2141 for insomnia. Ultram 100mg 
administered at 2235 for (R) leg pain with good effect. VS stable.

## 2021-02-22 VITALS — SYSTOLIC BLOOD PRESSURE: 146 MMHG | DIASTOLIC BLOOD PRESSURE: 63 MMHG

## 2021-02-22 VITALS — DIASTOLIC BLOOD PRESSURE: 82 MMHG | SYSTOLIC BLOOD PRESSURE: 139 MMHG

## 2021-02-22 VITALS — DIASTOLIC BLOOD PRESSURE: 75 MMHG | SYSTOLIC BLOOD PRESSURE: 148 MMHG

## 2021-02-22 RX ADMIN — CLONAZEPAM PRN MG: 0.5 TABLET ORAL at 20:13

## 2021-02-22 RX ADMIN — LEVOTHYROXINE SODIUM SCH MCG: 75 TABLET ORAL at 06:13

## 2021-02-22 RX ADMIN — CLONAZEPAM PRN MG: 0.5 TABLET ORAL at 12:05

## 2021-02-22 RX ADMIN — Medication PRN MG: at 18:05

## 2021-02-22 RX ADMIN — DIVALPROEX SODIUM SCH MG: 250 TABLET, DELAYED RELEASE ORAL at 12:05

## 2021-02-22 RX ADMIN — TEMAZEPAM PRN MG: 7.5 CAPSULE ORAL at 21:13

## 2021-02-22 RX ADMIN — DIVALPROEX SODIUM SCH MG: 250 TABLET, DELAYED RELEASE ORAL at 08:16

## 2021-02-22 RX ADMIN — SIMVASTATIN SCH MG: 20 TABLET, FILM COATED ORAL at 17:13

## 2021-02-22 RX ADMIN — ARIPIPRAZOLE SCH MG: 10 TABLET ORAL at 20:13

## 2021-02-22 RX ADMIN — VALSARTAN SCH MG: 40 TABLET ORAL at 08:16

## 2021-02-22 RX ADMIN — CLONAZEPAM PRN MG: 0.5 TABLET ORAL at 16:10

## 2021-02-22 RX ADMIN — Medication PRN MG: at 07:04

## 2021-02-22 RX ADMIN — CITALOPRAM HYDROBROMIDE SCH MG: 20 TABLET, FILM COATED ORAL at 08:16

## 2021-02-22 RX ADMIN — PANTOPRAZOLE SODIUM SCH MG: 40 TABLET, DELAYED RELEASE ORAL at 06:13

## 2021-02-22 RX ADMIN — DIVALPROEX SODIUM SCH MG: 250 TABLET, DELAYED RELEASE ORAL at 16:10

## 2021-02-22 NOTE — NUR
Firearms Report:

 completed and submitted a [employer] firearms report for 5150 danger to self 
certifications. A copy of report has been placed in patient chart.

## 2021-02-22 NOTE — NUR
EMILY Initial Discharge:

Patient currently resides at home [address] [facility] [address], [place] [address] 
([phone]) by herself. Patient would like to return home upon discharge. Patient's 
daughter Shala Simental ([phone]) is involved in the patient's care. EMILY will continue 
to work with patient, family, and MD to ensure a safe and proper discharge plan.

## 2021-02-22 NOTE — NUR
Brief Substance Abuse Intervention:

Patient was provided with a brief substance abuse intervention and referred to the following 
substance abuse programs: [facility] Substance Abuse Self-helpline ([phone]); 
[facility] [address] [place] [address] ([phone]); [facility] [address] [facility] [address] ([phone]); [facility] 
Program ([phone]); [facility] ([phone]); [facility] ([phone]); [facility] ([phone]).

## 2021-02-22 NOTE — NUR
EMILY Family Contact:



SW spoke with patient's daughter Shala Simental ([phone]) and discussed treatment and 
discharge.

## 2021-02-23 VITALS — SYSTOLIC BLOOD PRESSURE: 146 MMHG | DIASTOLIC BLOOD PRESSURE: 81 MMHG

## 2021-02-23 VITALS — DIASTOLIC BLOOD PRESSURE: 67 MMHG | SYSTOLIC BLOOD PRESSURE: 142 MMHG

## 2021-02-23 VITALS — SYSTOLIC BLOOD PRESSURE: 146 MMHG | DIASTOLIC BLOOD PRESSURE: 70 MMHG

## 2021-02-23 RX ADMIN — CITALOPRAM HYDROBROMIDE SCH MG: 20 TABLET, FILM COATED ORAL at 08:39

## 2021-02-23 RX ADMIN — DIVALPROEX SODIUM SCH MG: 250 TABLET, DELAYED RELEASE ORAL at 12:29

## 2021-02-23 RX ADMIN — TEMAZEPAM PRN MG: 7.5 CAPSULE ORAL at 21:04

## 2021-02-23 RX ADMIN — LEVOTHYROXINE SODIUM SCH MCG: 75 TABLET ORAL at 06:13

## 2021-02-23 RX ADMIN — CLONAZEPAM PRN MG: 0.5 TABLET ORAL at 12:29

## 2021-02-23 RX ADMIN — DIVALPROEX SODIUM SCH MG: 250 TABLET, DELAYED RELEASE ORAL at 08:39

## 2021-02-23 RX ADMIN — Medication PRN MG: at 17:04

## 2021-02-23 RX ADMIN — ARIPIPRAZOLE SCH MG: 10 TABLET ORAL at 20:03

## 2021-02-23 RX ADMIN — CLONAZEPAM PRN MG: 0.5 TABLET ORAL at 17:12

## 2021-02-23 RX ADMIN — SIMVASTATIN SCH MG: 20 TABLET, FILM COATED ORAL at 18:08

## 2021-02-23 RX ADMIN — VALSARTAN SCH MG: 40 TABLET ORAL at 08:39

## 2021-02-23 RX ADMIN — Medication PRN MG: at 08:31

## 2021-02-23 RX ADMIN — PANTOPRAZOLE SODIUM SCH MG: 40 TABLET, DELAYED RELEASE ORAL at 06:13

## 2021-02-23 RX ADMIN — HYDROCORTISONE PRN GM: 1 CREAM TOPICAL at 17:36

## 2021-02-23 RX ADMIN — DIVALPROEX SODIUM SCH MG: 250 TABLET, DELAYED RELEASE ORAL at 16:40

## 2021-02-24 VITALS — DIASTOLIC BLOOD PRESSURE: 73 MMHG | SYSTOLIC BLOOD PRESSURE: 128 MMHG

## 2021-02-24 VITALS — DIASTOLIC BLOOD PRESSURE: 63 MMHG | SYSTOLIC BLOOD PRESSURE: 136 MMHG

## 2021-02-24 VITALS — SYSTOLIC BLOOD PRESSURE: 159 MMHG | DIASTOLIC BLOOD PRESSURE: 83 MMHG

## 2021-02-24 RX ADMIN — CLONAZEPAM PRN MG: 0.5 TABLET ORAL at 14:32

## 2021-02-24 RX ADMIN — CLONAZEPAM PRN MG: 0.5 TABLET ORAL at 03:48

## 2021-02-24 RX ADMIN — SIMVASTATIN SCH MG: 20 TABLET, FILM COATED ORAL at 17:10

## 2021-02-24 RX ADMIN — CITALOPRAM HYDROBROMIDE SCH MG: 20 TABLET, FILM COATED ORAL at 08:01

## 2021-02-24 RX ADMIN — DIVALPROEX SODIUM SCH MG: 250 TABLET, DELAYED RELEASE ORAL at 12:12

## 2021-02-24 RX ADMIN — DIVALPROEX SODIUM SCH MG: 250 TABLET, DELAYED RELEASE ORAL at 16:19

## 2021-02-24 RX ADMIN — VALSARTAN SCH MG: 40 TABLET ORAL at 08:00

## 2021-02-24 RX ADMIN — HYDROCORTISONE PRN APPLIC: 1 CREAM TOPICAL at 11:10

## 2021-02-24 RX ADMIN — CLONAZEPAM PRN MG: 0.5 TABLET ORAL at 09:29

## 2021-02-24 RX ADMIN — Medication PRN MG: at 16:25

## 2021-02-24 RX ADMIN — LEVOTHYROXINE SODIUM SCH MCG: 75 TABLET ORAL at 06:19

## 2021-02-24 RX ADMIN — ARIPIPRAZOLE SCH MG: 10 TABLET ORAL at 20:14

## 2021-02-24 RX ADMIN — PANTOPRAZOLE SODIUM SCH MG: 40 TABLET, DELAYED RELEASE ORAL at 06:19

## 2021-02-24 RX ADMIN — Medication PRN MG: at 08:01

## 2021-02-24 RX ADMIN — TEMAZEPAM PRN MG: 7.5 CAPSULE ORAL at 21:18

## 2021-02-24 RX ADMIN — DIVALPROEX SODIUM SCH MG: 250 TABLET, DELAYED RELEASE ORAL at 08:01

## 2021-02-24 NOTE — NUR
EMILY Family Contact:



SW spoke with patient's daughter, Shala ([phone]) and updated on patient's current 
treatment plan and discharge plan. Shala is understanding of the reasons the patient is 
hospitalized and stated that the patient keeps calling her telling her to pick her up. Shala 
stated that she will wait for the MD or court to release the patient.

## 2021-02-24 NOTE — NUR
SW Individual Note:



SW met with patient due to patient becoming anxious, agitated, and fixated on being 
discharge from the hospital. SW educated patient regarding hospitalization, PC hearing, and 
that the MD will discharge the patient upon being stable. Patient stated "they put words in 
my mouth I was never suicidal". SW redirected patient however patient is not receptive at 
this time. SW encouraged patient to express her feelings with the psychiatrist.

## 2021-02-24 NOTE — NUR
patient guarded, withdrawn, and anxious. she has minimal interaction with peers. she appears 
disheveled and unkempt. patient is medication seeking with PRN Klonopin and Tramadol. she is 
provided with education about coping skills for pain and anxiety but stating "nothing works" 
and continues asking for medications. patient is adherent with prescribed medications, no 
adverse reaction noted. patient denies SI/HI, denies AH/VH. she is minimizing reason for 
admission. she is able to perform self care and ADL's independently. patient encouraged to 
participate in the unit milieu. able to communicate needs to staff appropriately.

## 2021-02-25 VITALS — SYSTOLIC BLOOD PRESSURE: 132 MMHG | DIASTOLIC BLOOD PRESSURE: 72 MMHG

## 2021-02-25 VITALS — DIASTOLIC BLOOD PRESSURE: 65 MMHG | SYSTOLIC BLOOD PRESSURE: 134 MMHG

## 2021-02-25 VITALS — SYSTOLIC BLOOD PRESSURE: 121 MMHG | DIASTOLIC BLOOD PRESSURE: 61 MMHG

## 2021-02-25 RX ADMIN — DIVALPROEX SODIUM SCH MG: 250 TABLET, DELAYED RELEASE ORAL at 08:44

## 2021-02-25 RX ADMIN — CLONAZEPAM PRN MG: 0.5 TABLET ORAL at 08:50

## 2021-02-25 RX ADMIN — VALSARTAN SCH MG: 40 TABLET ORAL at 08:43

## 2021-02-25 RX ADMIN — TEMAZEPAM PRN MG: 7.5 CAPSULE ORAL at 22:22

## 2021-02-25 RX ADMIN — Medication PRN MG: at 17:37

## 2021-02-25 RX ADMIN — LEVOTHYROXINE SODIUM SCH MCG: 75 TABLET ORAL at 06:13

## 2021-02-25 RX ADMIN — DIVALPROEX SODIUM SCH MG: 250 TABLET, DELAYED RELEASE ORAL at 12:44

## 2021-02-25 RX ADMIN — DIVALPROEX SODIUM SCH MG: 250 TABLET, DELAYED RELEASE ORAL at 17:32

## 2021-02-25 RX ADMIN — ARIPIPRAZOLE SCH MG: 10 TABLET ORAL at 20:09

## 2021-02-25 RX ADMIN — SIMVASTATIN SCH MG: 20 TABLET, FILM COATED ORAL at 17:32

## 2021-02-25 RX ADMIN — PANTOPRAZOLE SODIUM SCH MG: 40 TABLET, DELAYED RELEASE ORAL at 06:13

## 2021-02-25 RX ADMIN — Medication PRN MG: at 06:28

## 2021-02-25 RX ADMIN — CITALOPRAM HYDROBROMIDE SCH MG: 20 TABLET, FILM COATED ORAL at 08:44

## 2021-02-25 NOTE — NUR
Gps/Lvn- Stayed in her room most of the afternoon,interacting with her roommate,denies any 
discomfort at this  time.

## 2021-02-25 NOTE — NUR
EMILY PC Hearing: 

Patient had 5250 probable cause hearing today and it was upheld for grave disability.

## 2021-02-25 NOTE — NUR
EMILY Family Contact:



EMILY spoke with patient's daughter, Shala ([phone]) and informed of patient's discharge 
date set for Saturday. Shala stated she will  the patient at 11am.

## 2021-02-26 VITALS — DIASTOLIC BLOOD PRESSURE: 73 MMHG | SYSTOLIC BLOOD PRESSURE: 133 MMHG

## 2021-02-26 VITALS — DIASTOLIC BLOOD PRESSURE: 69 MMHG | SYSTOLIC BLOOD PRESSURE: 122 MMHG

## 2021-02-26 VITALS — DIASTOLIC BLOOD PRESSURE: 94 MMHG | SYSTOLIC BLOOD PRESSURE: 128 MMHG

## 2021-02-26 RX ADMIN — ARIPIPRAZOLE SCH MG: 10 TABLET ORAL at 20:27

## 2021-02-26 RX ADMIN — CLONAZEPAM PRN MG: 0.5 TABLET ORAL at 06:30

## 2021-02-26 RX ADMIN — TEMAZEPAM PRN MG: 7.5 CAPSULE ORAL at 21:44

## 2021-02-26 RX ADMIN — ANORECTAL OINTMENT SCH GM: 15.7; .44; 24; 20.6 OINTMENT TOPICAL at 16:00

## 2021-02-26 RX ADMIN — PANTOPRAZOLE SODIUM SCH MG: 40 TABLET, DELAYED RELEASE ORAL at 06:02

## 2021-02-26 RX ADMIN — CLOTRIMAZOLE SCH GM: 1 CREAM TOPICAL at 16:00

## 2021-02-26 RX ADMIN — DIVALPROEX SODIUM SCH MG: 250 TABLET, DELAYED RELEASE ORAL at 08:14

## 2021-02-26 RX ADMIN — LEVOTHYROXINE SODIUM SCH MCG: 75 TABLET ORAL at 06:02

## 2021-02-26 RX ADMIN — DIVALPROEX SODIUM SCH MG: 250 TABLET, DELAYED RELEASE ORAL at 16:12

## 2021-02-26 RX ADMIN — VALSARTAN SCH MG: 40 TABLET ORAL at 08:14

## 2021-02-26 RX ADMIN — MAGNESIUM HYDROXIDE PRN ML: 400 SUSPENSION ORAL at 14:12

## 2021-02-26 RX ADMIN — Medication PRN MG: at 05:26

## 2021-02-26 RX ADMIN — CITALOPRAM HYDROBROMIDE SCH MG: 20 TABLET, FILM COATED ORAL at 08:14

## 2021-02-26 RX ADMIN — CLONAZEPAM PRN MG: 0.5 TABLET ORAL at 17:54

## 2021-02-26 RX ADMIN — Medication PRN MG: at 17:55

## 2021-02-26 RX ADMIN — DIVALPROEX SODIUM SCH MG: 250 TABLET, DELAYED RELEASE ORAL at 12:21

## 2021-02-26 RX ADMIN — SIMVASTATIN SCH MG: 20 TABLET, FILM COATED ORAL at 16:12

## 2021-02-26 NOTE — NUR
WOUND CARE CONSULT: PT PRESENTS WITH RASHES TO BREASTFOLDS AND ABDOMINAL/GROIN FOLDS, 
PRESENT ON ADMISSION. RECOMMENDATIONS MADE FOR SKIN CARE AND PROTECTION. DISCUSSED WITH 
NURSING STAFF. MD IN AGREEMENT WITH PLAN OF CARE. 

-------------------------------------------------------------------------------

Addendum: 02/26/21 at 1159 by HAM PATRICK RN

-------------------------------------------------------------------------------

Amended: Links added.

## 2021-02-26 NOTE — NUR
EARLY ENTRY -  DISCHARGE NOTE:

Patient will be discharged home with her daughter daughter Shala Simental ([phone]) 
[address] [place] [address]. Shala will be providing transportation for the 
patient today at 11AM. Patent is alert and oriented x4 and is aware and agreeable with 
discharge plan. Patient presents with euthymic mood and congruent affect. Patient denies 
suicidal or homicidal ideation. Patient will be following up with her primary care physician 
Dr. Mark North [address] [facility] [address] [place] [address] ([phone]) and has an 
appointment scheduled on March 8, 2021 via telehealth at 9:45am. Patient will be following 
up with her psychiatrist Dr. Montrell Yo [address] [place], [address] 
([phone]) and has an appointment scheduled on March 1, 2021 at 1:45pm. Patient was 
provided with a brief substance abuse intervention and referred to the following substance 
abuse programs for marijuana and cigarette use: [facility] Substance Abuse 
Self-helpline ([phone]); [facility] [address] [place] [address] 
([phone]); [facility] [address] [facility] [address] ([phone]); 
[facility] Rehabilitation Program ([phone]); [facility] 
([phone]); [facility] ([phone]); [facility] ([phone]). 
For smoking cessation, patient was referred to [employer] 800-LUNGUSA and 
[employer] ([phone]).

## 2021-02-27 VITALS — SYSTOLIC BLOOD PRESSURE: 128 MMHG | DIASTOLIC BLOOD PRESSURE: 55 MMHG

## 2021-02-27 VITALS — DIASTOLIC BLOOD PRESSURE: 59 MMHG | SYSTOLIC BLOOD PRESSURE: 128 MMHG

## 2021-02-27 RX ADMIN — ANORECTAL OINTMENT SCH GM: 15.7; .44; 24; 20.6 OINTMENT TOPICAL at 08:04

## 2021-02-27 RX ADMIN — CITALOPRAM HYDROBROMIDE SCH MG: 20 TABLET, FILM COATED ORAL at 08:04

## 2021-02-27 RX ADMIN — LEVOTHYROXINE SODIUM SCH MCG: 75 TABLET ORAL at 06:03

## 2021-02-27 RX ADMIN — DIVALPROEX SODIUM SCH MG: 250 TABLET, DELAYED RELEASE ORAL at 08:04

## 2021-02-27 RX ADMIN — CLOTRIMAZOLE SCH GM: 1 CREAM TOPICAL at 08:04

## 2021-02-27 RX ADMIN — VALSARTAN SCH MG: 40 TABLET ORAL at 08:04

## 2021-02-27 RX ADMIN — PANTOPRAZOLE SODIUM SCH MG: 40 TABLET, DELAYED RELEASE ORAL at 06:03

## 2021-02-27 RX ADMIN — Medication PRN MG: at 04:57

## 2021-02-27 RX ADMIN — CLONAZEPAM PRN MG: 0.5 TABLET ORAL at 08:41

## 2021-02-27 NOTE — NUR
Gps/lvn- Daughter to to provide transportation . all belongings /valuables given back to 
patient. reviewed diet, prescriptions, safety, skin care, follow up with her Doctors as 
recommended, patient verbalized understanding..prescription of abilify 15 mg. po. was called 
in to her Pharmacy w/c delivers her medications, left message , state pharmacy on ly open 
monday-friday, informed pt. Claimed she has most of her medications, except abilify, 
informed she can have it filled up to other pharmacy for now,.